# Patient Record
Sex: FEMALE | Race: WHITE | Employment: FULL TIME | URBAN - METROPOLITAN AREA
[De-identification: names, ages, dates, MRNs, and addresses within clinical notes are randomized per-mention and may not be internally consistent; named-entity substitution may affect disease eponyms.]

---

## 2017-04-27 ENCOUNTER — HOSPITAL ENCOUNTER (OUTPATIENT)
Dept: GENERAL RADIOLOGY | Age: 57
Discharge: HOME | End: 2017-04-27
Payer: COMMERCIAL

## 2017-04-27 ENCOUNTER — HOSPITAL ENCOUNTER (OUTPATIENT)
Age: 57
Discharge: HOME | End: 2017-04-27
Payer: COMMERCIAL

## 2017-04-27 DIAGNOSIS — R05.9 COUGH: ICD-10-CM

## 2017-04-27 PROCEDURE — 71020 XR CHEST STANDARD TWO VW: CPT

## 2017-05-18 ENCOUNTER — HOSPITAL ENCOUNTER (OUTPATIENT)
Dept: VASCULAR LAB | Age: 57
Discharge: HOME | End: 2017-05-18
Payer: COMMERCIAL

## 2017-05-18 PROCEDURE — 93971 EXTREMITY STUDY: CPT

## 2017-08-17 ENCOUNTER — HOSPITAL ENCOUNTER (OUTPATIENT)
Age: 57
Discharge: HOME | End: 2017-08-17
Payer: COMMERCIAL

## 2017-08-17 ENCOUNTER — HOSPITAL ENCOUNTER (OUTPATIENT)
Dept: GENERAL RADIOLOGY | Age: 57
Discharge: HOME | End: 2017-08-17
Payer: COMMERCIAL

## 2017-08-17 DIAGNOSIS — R52 PAIN: ICD-10-CM

## 2017-08-17 DIAGNOSIS — R60.9 SWELLING: ICD-10-CM

## 2017-08-17 PROCEDURE — 73130 X-RAY EXAM OF HAND: CPT

## 2018-05-15 ENCOUNTER — HOSPITAL ENCOUNTER (OUTPATIENT)
Age: 58
Discharge: HOME | End: 2018-05-15
Payer: COMMERCIAL

## 2018-05-15 LAB
ABSOLUTE EOS #: 0.1 K/UL (ref 0–0.4)
ABSOLUTE EOS #: 0.1 K/UL (ref 0–0.4)
ABSOLUTE IMMATURE GRANULOCYTE: NORMAL K/UL (ref 0–0.3)
ABSOLUTE IMMATURE GRANULOCYTE: NORMAL K/UL (ref 0–0.3)
ABSOLUTE LYMPH #: 2.2 K/UL (ref 1–4.8)
ABSOLUTE LYMPH #: 2.2 K/UL (ref 1–4.8)
ABSOLUTE MONO #: 0.4 K/UL (ref 0.1–1.3)
ABSOLUTE MONO #: 0.4 K/UL (ref 0.1–1.3)
ALBUMIN SERPL-MCNC: 4.4 G/DL (ref 3.5–5.2)
ALBUMIN/GLOBULIN RATIO: ABNORMAL (ref 1–2.5)
ALP BLD-CCNC: 98 U/L (ref 35–104)
ALT SERPL-CCNC: 69 U/L (ref 5–33)
ANION GAP SERPL CALCULATED.3IONS-SCNC: 14 MMOL/L (ref 9–17)
AST SERPL-CCNC: 72 U/L
BASOPHILS # BLD: 0 % (ref 0–2)
BASOPHILS # BLD: 0 % (ref 0–2)
BASOPHILS ABSOLUTE: 0 K/UL (ref 0–0.2)
BASOPHILS ABSOLUTE: 0 K/UL (ref 0–0.2)
BILIRUB SERPL-MCNC: 0.51 MG/DL (ref 0.3–1.2)
BUN BLDV-MCNC: 8 MG/DL (ref 6–20)
BUN/CREAT BLD: ABNORMAL (ref 9–20)
C-REACTIVE PROTEIN: 10.1 MG/L (ref 0–5)
CALCIUM SERPL-MCNC: 9.1 MG/DL (ref 8.6–10.4)
CHLORIDE BLD-SCNC: 99 MMOL/L (ref 98–107)
CO2: 26 MMOL/L (ref 20–31)
CORTISOL COLLECTION INFO: NORMAL
CORTISOL: 12.1 UG/DL (ref 2.7–18.4)
CREAT SERPL-MCNC: 0.6 MG/DL (ref 0.5–0.9)
DIFFERENTIAL TYPE: NORMAL
DIFFERENTIAL TYPE: NORMAL
EOSINOPHILS RELATIVE PERCENT: 1 % (ref 0–4)
EOSINOPHILS RELATIVE PERCENT: 1 % (ref 0–4)
GFR AFRICAN AMERICAN: >60 ML/MIN
GFR NON-AFRICAN AMERICAN: >60 ML/MIN
GFR SERPL CREATININE-BSD FRML MDRD: ABNORMAL ML/MIN/{1.73_M2}
GFR SERPL CREATININE-BSD FRML MDRD: ABNORMAL ML/MIN/{1.73_M2}
GLUCOSE BLD-MCNC: 160 MG/DL (ref 70–99)
HCT VFR BLD CALC: 37.7 % (ref 36–46)
HCT VFR BLD CALC: 37.7 % (ref 36–46)
HEMOGLOBIN: 12.8 G/DL (ref 12–16)
HEMOGLOBIN: 12.8 G/DL (ref 12–16)
IMMATURE GRANULOCYTES: NORMAL %
IMMATURE GRANULOCYTES: NORMAL %
LYMPHOCYTES # BLD: 30 % (ref 24–44)
LYMPHOCYTES # BLD: 30 % (ref 24–44)
MCH RBC QN AUTO: 30.9 PG (ref 26–34)
MCH RBC QN AUTO: 30.9 PG (ref 26–34)
MCHC RBC AUTO-ENTMCNC: 33.9 G/DL (ref 31–37)
MCHC RBC AUTO-ENTMCNC: 33.9 G/DL (ref 31–37)
MCV RBC AUTO: 91.2 FL (ref 80–100)
MCV RBC AUTO: 91.2 FL (ref 80–100)
MONOCYTES # BLD: 6 % (ref 1–7)
MONOCYTES # BLD: 6 % (ref 1–7)
NRBC AUTOMATED: NORMAL PER 100 WBC
NRBC AUTOMATED: NORMAL PER 100 WBC
PDW BLD-RTO: 13.1 % (ref 11.5–14.9)
PDW BLD-RTO: 13.1 % (ref 11.5–14.9)
PLATELET # BLD: 266 K/UL (ref 150–450)
PLATELET # BLD: 266 K/UL (ref 150–450)
PLATELET ESTIMATE: NORMAL
PLATELET ESTIMATE: NORMAL
PMV BLD AUTO: 7.4 FL (ref 6–12)
PMV BLD AUTO: 7.4 FL (ref 6–12)
POTASSIUM SERPL-SCNC: 3.6 MMOL/L (ref 3.7–5.3)
RBC # BLD: 4.14 M/UL (ref 4–5.2)
RBC # BLD: 4.14 M/UL (ref 4–5.2)
RBC # BLD: NORMAL 10*6/UL
RBC # BLD: NORMAL 10*6/UL
RHEUMATOID FACTOR: <10 IU/ML
SEG NEUTROPHILS: 63 % (ref 36–66)
SEG NEUTROPHILS: 63 % (ref 36–66)
SEGMENTED NEUTROPHILS ABSOLUTE COUNT: 4.7 K/UL (ref 1.3–9.1)
SEGMENTED NEUTROPHILS ABSOLUTE COUNT: 4.7 K/UL (ref 1.3–9.1)
SODIUM BLD-SCNC: 139 MMOL/L (ref 135–144)
T3 FREE: 3.37 PG/ML (ref 2.02–4.43)
THYROXINE, FREE: 1.59 NG/DL (ref 0.93–1.7)
TOTAL PROTEIN: 7.1 G/DL (ref 6.4–8.3)
TSH SERPL DL<=0.05 MIU/L-ACNC: 1.52 MIU/L (ref 0.3–5)
VITAMIN D 25-HYDROXY: 34.1 NG/ML (ref 30–100)
WBC # BLD: 7.4 K/UL (ref 3.5–11)
WBC # BLD: 7.4 K/UL (ref 3.5–11)
WBC # BLD: NORMAL 10*3/UL
WBC # BLD: NORMAL 10*3/UL

## 2018-05-15 PROCEDURE — 80053 COMPREHEN METABOLIC PANEL: CPT

## 2018-05-15 PROCEDURE — 84443 ASSAY THYROID STIM HORMONE: CPT

## 2018-05-15 PROCEDURE — 86140 C-REACTIVE PROTEIN: CPT

## 2018-05-15 PROCEDURE — 82024 ASSAY OF ACTH: CPT

## 2018-05-15 PROCEDURE — 84481 FREE ASSAY (FT-3): CPT

## 2018-05-15 PROCEDURE — 85025 COMPLETE CBC W/AUTO DIFF WBC: CPT

## 2018-05-15 PROCEDURE — 86431 RHEUMATOID FACTOR QUANT: CPT

## 2018-05-15 PROCEDURE — 82306 VITAMIN D 25 HYDROXY: CPT

## 2018-05-15 PROCEDURE — 83970 ASSAY OF PARATHORMONE: CPT

## 2018-05-15 PROCEDURE — 86038 ANTINUCLEAR ANTIBODIES: CPT

## 2018-05-15 PROCEDURE — 84439 ASSAY OF FREE THYROXINE: CPT

## 2018-05-15 PROCEDURE — 82330 ASSAY OF CALCIUM: CPT

## 2018-05-15 PROCEDURE — 82533 TOTAL CORTISOL: CPT

## 2018-05-15 PROCEDURE — 36415 COLL VENOUS BLD VENIPUNCTURE: CPT

## 2018-05-16 LAB
ADRENOCORTICOTROPIC HORMONE: 11 PG/ML (ref 6–58)
ANTI-NUCLEAR ANTIBODY (ANA): NEGATIVE
CALCIUM IONIZED: NORMAL MMOL/L (ref 1.13–1.33)
PTH INTACT: 41.25 PG/ML (ref 15–65)

## 2018-06-27 ENCOUNTER — HOSPITAL ENCOUNTER (OUTPATIENT)
Dept: DIABETES SERVICES | Age: 58
Setting detail: THERAPIES SERIES
Discharge: HOME | End: 2018-06-27
Payer: COMMERCIAL

## 2018-06-27 VITALS — BODY MASS INDEX: 32.14 KG/M2 | HEIGHT: 64 IN | WEIGHT: 188.27 LBS

## 2018-06-27 PROCEDURE — G0108 DIAB MANAGE TRN  PER INDIV: HCPCS

## 2018-07-30 ENCOUNTER — HOSPITAL ENCOUNTER (OUTPATIENT)
Dept: DIABETES SERVICES | Age: 58
Setting detail: THERAPIES SERIES
Discharge: HOME | End: 2018-07-30
Payer: COMMERCIAL

## 2018-07-30 VITALS — BODY MASS INDEX: 30.73 KG/M2 | WEIGHT: 179.01 LBS

## 2018-07-30 PROCEDURE — G0108 DIAB MANAGE TRN  PER INDIV: HCPCS

## 2018-07-30 NOTE — PROGRESS NOTES
Injectable - Appropriate injection sites; proper storage; supplies needed; proper technique; safe needle disposal guidelines. 1       Monitoring blood glucose, interpreting and using results:  Identify recommended & personal blood glucose targets; importance of testing; testing supplies; HgbA1C target levels; Factors affecting blood glucose; Importance of logging blood glucose levels for pattern recognition; ketone testing; safe lancet disposal.   1 7/30/18cb   Not testing. Random BG after food poisoning was 163 so Dr did A1C after weekend and was 7.4%  Discussed A1C range for non DM and pre DM.6/27/18CB    Set up demo Contour Next and given to pt ( free of charge). Self BG done 112 after eating apple. 7/30/18cb   Prevention, detection & treatment of acute complications:  Identify symptoms of hyper & hypoglycemia, and prevention & treatment strategies. 1 7/30/18cb   Pt grew up with hypoglycemia and avoided sweets back then, SX included shaky, headache, nausea, sometimes vomiting and usually drank a little pop.  6/27/18CB     Describe sick day guidelines & indications for  physician notification. Identify short term consequences of poor control. 1    Did have resp virus and Solu Medrol pack over winter pre DM diagnosis. Discussed effects on BG with illness and steroids. 7/30/18cb   Prevention, detection & treatment of chronic complications:  Define the natural course of diabetes & describe the relationship of blood glucose levels to long term complications of diabetes. Identify preventative measures & standards of care. 1       Developing strategies to address psychosocial issues:  Describe feelings about living with diabetes; Describe how stress, depression & anxiety affect blood glucose; Identify coping strategies; Identify support needed & support network available. 1    Concerned and wants to improve BG. Motivated to know how many CHO to stay with meals and snacks and to lose weight.   [facility] information sheet       []Self-Management  Gestational - RN class -Resource materials provided today include educational self care booklet - \" Gestational Diabetes - A Healthy pregnancy and beyond\"  with SMGB and 3 small meals and 3 small snacks diet plan. SMBG sheets to fax back to MFM weekly.  [facility] guidelines for SMGB and blood glucose log sheets, Never too early to prevent diabetes handout from [facility]      []Self-Management Gestational - RD class - My Food Plan for Gestational diabetes    [x]Glucose Meter set up Contour Next ( free of charge) to take home  []Insulin Kit     []Other      Encounter Type Date Start Time End Time Comments No Show Dates   Assessment 6/27/18CB   3:00 4:00   [x]In Person  []Telephone    Class 1 - Understanding diabetes 7/30/18cb 4:30 5:45  x    Class 2- Nutrition and diabetes          Class 3 - Preventing Complications         Class 4 -  In depth Nutrition and sick day care        Class 5 - 3 month follow up / goal reassessment        Gestational - RN         Gestational - RD        Individual MNT         Shared Med Appt         Yearly Follow-up        Meter Instrx 7/30/18cb 5:15 5:30 X set up Contour Next ( free of charge) to take home    Insulin Instrx      []Pen  []Vial & Syringe      DSMS Support Plan:  Follow-up plan:     [] MNT referral request / Appointment     [] Annual update referral request and appointment  after      []ADA  Where do I Begin, Living with Type 2 diabetes ADA home support program     [] Healthy U - Diabetes workshops via [address] [place] [address] [phone]      [] Starting [employer] program /  [employer] [phone]    []  Support Group / Yadira Huerta of [employer] - Free 6 week diabetes education support   classes Maribell Chacon [phone]      []   [employer] application  / scholarship program     []ADA care 4 life [place]      []  Internet web sites - ADA and D- life    Post Education Referrals:      [] [address] [facility] [address] information sheet and [address] [facility] , 21       [] Dental care - Dental care of [facility]     [] [facility] link  phone number - for information and referral to [address], WEIGHT MANAGEMENT        []Other  Kaye Stewart RN

## 2018-08-22 ENCOUNTER — HOSPITAL ENCOUNTER (OUTPATIENT)
Dept: DIABETES SERVICES | Age: 58
Setting detail: THERAPIES SERIES
Discharge: HOME | End: 2018-08-22
Payer: COMMERCIAL

## 2018-08-22 PROCEDURE — G0108 DIAB MANAGE TRN  PER INDIV: HCPCS

## 2018-08-22 NOTE — PROGRESS NOTES
Diabetes Self- Management Education Program Assessment -   Also see Diabetic Screening  Patient, Wendy Walter,  here for diabetes self-management education  visit/ assessment. Today's visit was in an individual setting. Diet History :  Diet Questionnaire and typical meal /portion sheet completed  [x]Yes  [] NO    Goals setting:  Goal work sheet completed  [x]Yes  [] NO  (SEE  EDUCATION AND GOALS FLOWSHEET)    MEDICAL HISTORY:  No past medical history on file. No family history on file. Codeine and Ibuprofen   Immunization History   Administered Date(s) Administered    Influenza Virus Vaccine 10/06/2017     Current Medications  Current Outpatient Prescriptions   Medication Sig Dispense Refill    metFORMIN (GLUCOPHAGE) 1000 MG tablet Take 1,000 mg by mouth 2 times daily (with meals) Taking half of the 1000 mg tablet 2x/d        No current facility-administered medications for this encounter.    :     Comments:  Allergies: Allergies   Allergen Reactions    Codeine      BILIARY COLIC    Ibuprofen Itching and Rash     Diabetes 5  / Health Status    A1C blood level - at goal < 7%   No results found for: LABA1C  Lab Results   Component Value Date    CREATININE 0.60 05/15/2018       Blood pressure ( 130/ 80)  Or less  BP Readings from Last 3 Encounters:   No data found for BP        Cholesterol ( LDL under  100)   Lab Results   Component Value Date    LDLCHOLESTEROL 87 12/06/2013       4 . Smoking ? []Yes   [x]No    5. Taking an Asprin daily? []Yes   [x]No          Diabetes Self- Management Education Record    Participant Name: Wendy Walter  Referring Provider: Sridevi Urias   Assessment/Evaluation Ratings:  1=Needs Instruction   4=Demonstrates Understanding/Competency  2=Needs Review   NC=Not Covered    3=Comprehends Key Points  N/A=Not Applicable  Topics/Learning Objectives Pre-session Assess Date:  6/27/18CB Instr.  Date Reinforce Date Post- session Eval Comments   Diabetes disease process & MNT referral request / Appointment     [] Annual update referral request and appointment  after      []ADA  Where do I Begin, Living with Type 2 diabetes ADA home support program     [] Healthy U - Diabetes workshops via [facility] link       [] Starting [employer] program /  [employer] [phone]    []  Support Group / Mary  6 week diabetes education support   classes Helena Roger [phone]      []   [email or web address] application  / scholarship program     []ADA care 4 life [place]      []  Internet web sites - ADA and D- life    Post Education Referrals:      [] [address] [facility] information sheet and [address] [facility] , 21       [] Dental care - Dental care of [employer]     [] [facility] link  phone number - for information and referral to [facility]  Clinically  [address] [facility], WEIGHT MANAGEMENT        []Other  Galina Chino RN

## 2018-08-27 ENCOUNTER — HOSPITAL ENCOUNTER (OUTPATIENT)
Dept: DIABETES SERVICES | Age: 58
Setting detail: THERAPIES SERIES
Discharge: HOME | End: 2018-08-27
Payer: COMMERCIAL

## 2018-08-27 DIAGNOSIS — E11.9 TYPE 2 DIABETES MELLITUS WITHOUT COMPLICATION, WITHOUT LONG-TERM CURRENT USE OF INSULIN (HCC): Primary | ICD-10-CM

## 2018-08-27 PROCEDURE — G0108 DIAB MANAGE TRN  PER INDIV: HCPCS

## 2018-08-27 NOTE — PROGRESS NOTES
Treatment process: Define diabetes & pre-diabetes; Identify own type of diabetes; role of the pancreas; signs/symptoms; diagnostic criteria; prevention & treatment options; contributing factors. 2 7/30/18cb 8/22/18CB  Some knowledge as she works [employer], Grandmother T2 DM         6/27/18CB  Has not really told anyone about her DM as coworker has DM on insulin but not as careful with control. 7/30/18cb     Incorporating nutritional management into lifestyle: Describe effect of type, amount & timing of food on blood glucose; Describe basic meal planning techniques & current nutrition guideline   1 eats 3 meals, occ snacks, drinks diet Coke but cut down to 1 can/d, mainly water    Had food poisoning  End of May and just now getting appetite back, lost 20#        6/27/18CB 8/27/18 JW   What to eat - Food groups, When to eat - timing of meals and snacks, and How much to eat - portions control. 1500 calories/ day   CHO choices/ meal  3,1,3,1,3,1   CHO choices/  day   grams of protein /day   gram of fat /day     Correctly read food labels & demonstrate CHO counting & portion control with personalized meal plan. Identify dining out strategies, & dietary sick day guidelines. 1 8/27/18 JW   Info given about probiotics to help with IBS   Incorporating physical activity into lifestyle:   Verbalize effect of exercise on blood glucose levels; benefits of regular exercise; safety considerations; contraindications; maintenance of activity. 1 7/30/18cb 8/22/18CB  Trying to walk daily with puppy for 15 min, but does have herniated disc in both neck and back. 6/27/18CB    Walking dog around pond for about 30 min. 7/30/18cb   Using medications safely:  Identify effects of diabetes medicines on blood glucose levels;  List diabetes medication taken, action & side effects;    1 7/30/18cb 8/22/18CB  Metformin 1000 mg, had diarrhea taking it 2x/d and talked with Pharmacist/Dr, cut 1/2 and taking (500 mg) 2x/d and much better. 6/27/18CB    Now taking Metformin 1000 mg BID and added Metamucil HS which helps7/30/18cb    Tried to increase Metformin to take 500 mg am ad 1000 mg evening but having diarrhea. suggested to request XR.   8/22/18CB   Insulin / Injectable - Appropriate injection sites; proper storage; supplies needed; proper technique; safe needle disposal guidelines. 1 8/22/18CB   NA   Monitoring blood glucose, interpreting and using results:  Identify recommended & personal blood glucose targets; importance of testing; testing supplies; HgbA1C target levels; Factors affecting blood glucose; Importance of logging blood glucose levels for pattern recognition; ketone testing; safe lancet disposal.   1 7/30/18cb 8/22/18CB  Not testing. Random BG after food poisoning was 163 so Dr did A1C after weekend and was 7.4%  Discussed A1C range for non DM and pre DM.6/27/18CB    Set up demo Contour Next and given to pt ( free of charge). Self BG done 112 after eating apple. 7/30/18cb    Has tested several times, fasting 106-112, 77 pre lunch and had blurred vision. 8/22/18CB   Prevention, detection & treatment of acute complications:  Identify symptoms of hyper & hypoglycemia, and prevention & treatment strategies. 1 7/30/18cb 8/22/18CB  Pt grew up with hypoglycemia and avoided sweets back then, SX included shaky, headache, nausea, sometimes vomiting and usually drank a little pop.  6/27/18CB     Describe sick day guidelines & indications for  physician notification. Identify short term consequences of poor control. 1    Did have resp virus and Solu Medrol pack over winter pre DM diagnosis. Discussed effects on BG with illness and steroids. 7/30/18cb   Prevention, detection & treatment of chronic complications:  Define the natural course of diabetes & describe the relationship of blood glucose levels to long term complications of diabetes. Identify preventative measures & standards of care.    1 8/22/18CB   Had flu vaccine, no Counting / Plate Method, Nutrient Conversion and International Diabetes [address] Eating for People with Diabetes and Nutrition in the [employer] - fast facts about fast food 8/27/18 JW    [x] Self-Management  Class 3 -  Diabetes ID card,  foot care tips sheet, Healthy I  Continuing Your Journey with Diabetes map handout, Individualized Diabetes report card8/22/18CB    [] Self-Management Class 4 - BD Booklet  Sick Day Rules and  Dinning Out Guide , recipe hand outs and tips, diabetes Cookbooks  ( when available)     []Self-Management - 3 month follow - up  AADE booklet Side by Byron Steward a partnership approach to diabetes self- care, [place] [employer] Quit line, [facility] Diabetes support program information sheet, [facility] [employer] information sheet       []Self-Management  Gestational - RN class -Resource materials provided today include educational self care booklet - \" Gestational Diabetes - A Healthy pregnancy and beyond\"  with SMGB and 3 small meals and 3 small snacks diet plan. SMBG sheets to fax back to [facility] weekly.  [facility] guidelines for SMGB and blood glucose log sheets, Never too early to prevent diabetes handout from [facility]      []Self-Management Gestational - RD class - My Food Plan for Gestational diabetes    [x]Glucose Meter set up Contour Next ( free of charge) to take home  []Insulin Kit     []Other      Encounter Type Date Start Time End Time Comments No Show Dates   Assessment 6/27/18CB   3:00 4:00   [x]In Person  []Telephone    Class 1 - Understanding diabetes 7/30/18cb 4:30 5:45  x    Class 2- Nutrition and diabetes   8/27/18 JW 1:30 3:00 x    Class 3 - Preventing Complications 9/44/52CH 3:18 5:45      Class 4 -  In depth Nutrition and sick day care        Class 5 - 3 month follow up / goal reassessment        Gestational - RN         Gestational - RD        Individual MNT         Shared Med Appt         Yearly Follow-up        Meter Instrx 7/30/18cb 5:15 5:30 X set up Contour Next

## 2021-09-02 ENCOUNTER — HOSPITAL ENCOUNTER (OUTPATIENT)
Age: 61
Setting detail: SPECIMEN
Discharge: HOME | End: 2021-09-02
Payer: COMMERCIAL

## 2021-09-03 LAB
CREATININE URINE: 77.1 MG/DL (ref 28–217)
MICROALBUMIN/CREAT 24H UR: <12 MG/L
MICROALBUMIN/CREAT UR-RTO: NORMAL MCG/MG CREAT

## 2021-12-21 ENCOUNTER — HOSPITAL ENCOUNTER (OUTPATIENT)
Age: 61
Discharge: HOME | End: 2021-12-21
Payer: COMMERCIAL

## 2021-12-21 LAB
ALT SERPL-CCNC: 21 U/L (ref 5–33)
ANION GAP SERPL CALCULATED.3IONS-SCNC: 11 MMOL/L (ref 9–17)
BUN BLDV-MCNC: 11 MG/DL (ref 8–23)
BUN/CREAT BLD: ABNORMAL (ref 9–20)
CALCIUM SERPL-MCNC: 9.7 MG/DL (ref 8.6–10.4)
CHLORIDE BLD-SCNC: 102 MMOL/L (ref 98–107)
CHOLESTEROL/HDL RATIO: 3.9
CHOLESTEROL: 251 MG/DL
CO2: 26 MMOL/L (ref 20–31)
CREAT SERPL-MCNC: 0.66 MG/DL (ref 0.5–0.9)
GFR AFRICAN AMERICAN: >60 ML/MIN
GFR NON-AFRICAN AMERICAN: >60 ML/MIN
GFR SERPL CREATININE-BSD FRML MDRD: ABNORMAL ML/MIN/{1.73_M2}
GFR SERPL CREATININE-BSD FRML MDRD: ABNORMAL ML/MIN/{1.73_M2}
GLUCOSE BLD-MCNC: 105 MG/DL (ref 70–99)
HDLC SERPL-MCNC: 65 MG/DL
LDL CHOLESTEROL: 161 MG/DL (ref 0–130)
POTASSIUM SERPL-SCNC: 4.8 MMOL/L (ref 3.7–5.3)
SODIUM BLD-SCNC: 139 MMOL/L (ref 135–144)
TRIGL SERPL-MCNC: 127 MG/DL
TSH SERPL DL<=0.05 MIU/L-ACNC: 0.82 MIU/L (ref 0.3–5)
VITAMIN D 25-HYDROXY: 64.6 NG/ML (ref 30–100)
VLDLC SERPL CALC-MCNC: ABNORMAL MG/DL (ref 1–30)

## 2021-12-21 PROCEDURE — 84443 ASSAY THYROID STIM HORMONE: CPT

## 2021-12-21 PROCEDURE — 80061 LIPID PANEL: CPT

## 2021-12-21 PROCEDURE — 80048 BASIC METABOLIC PNL TOTAL CA: CPT

## 2021-12-21 PROCEDURE — 36415 COLL VENOUS BLD VENIPUNCTURE: CPT

## 2021-12-21 PROCEDURE — 84460 ALANINE AMINO (ALT) (SGPT): CPT

## 2021-12-21 PROCEDURE — 82306 VITAMIN D 25 HYDROXY: CPT

## 2021-12-23 ENCOUNTER — HOSPITAL ENCOUNTER (OUTPATIENT)
Age: 61
Setting detail: SPECIMEN
Discharge: HOME | End: 2021-12-23

## 2021-12-23 LAB
CREATININE URINE: 25.2 MG/DL (ref 28–217)
MICROALBUMIN/CREAT 24H UR: <12 MG/L
MICROALBUMIN/CREAT UR-RTO: ABNORMAL MCG/MG CREAT

## 2023-08-23 ENCOUNTER — HOSPITAL ENCOUNTER (OUTPATIENT)
Age: 63
Setting detail: SPECIMEN
Discharge: HOME | End: 2023-08-23

## 2023-08-23 LAB
CREAT UR-MCNC: 116 MG/DL (ref 28–217)
MICROALBUMIN UR-MCNC: <12 MG/L
MICROALBUMIN/CREAT UR-RTO: NORMAL MCG/MG CREAT

## 2024-01-02 ENCOUNTER — HOSPITAL ENCOUNTER (OUTPATIENT)
Age: 64
Setting detail: SPECIMEN
Discharge: HOME | End: 2024-01-02

## 2024-01-03 LAB
CREAT UR-MCNC: 131.7 MG/DL (ref 28–217)
MICROALBUMIN UR-MCNC: <12 MG/L
MICROALBUMIN/CREAT UR-RTO: NORMAL MCG/MG CREAT

## 2025-03-03 ENCOUNTER — OFFICE VISIT (OUTPATIENT)
Dept: PODIATRY | Age: 65
End: 2025-03-03
Payer: COMMERCIAL

## 2025-03-03 VITALS — HEIGHT: 63 IN | WEIGHT: 170 LBS | BODY MASS INDEX: 30.12 KG/M2

## 2025-03-03 DIAGNOSIS — L60.0 ONYCHOCRYPTOSIS: Primary | ICD-10-CM

## 2025-03-03 DIAGNOSIS — M79.674 PAIN IN TOE OF RIGHT FOOT: ICD-10-CM

## 2025-03-03 PROCEDURE — G8400 PT W/DXA NO RESULTS DOC: HCPCS | Performed by: PODIATRIST

## 2025-03-03 PROCEDURE — 99203 OFFICE O/P NEW LOW 30 MIN: CPT | Performed by: PODIATRIST

## 2025-03-03 PROCEDURE — G8427 DOCREV CUR MEDS BY ELIG CLIN: HCPCS | Performed by: PODIATRIST

## 2025-03-03 PROCEDURE — 3017F COLORECTAL CA SCREEN DOC REV: CPT | Performed by: PODIATRIST

## 2025-03-03 PROCEDURE — G8417 CALC BMI ABV UP PARAM F/U: HCPCS | Performed by: PODIATRIST

## 2025-03-03 PROCEDURE — 1036F TOBACCO NON-USER: CPT | Performed by: PODIATRIST

## 2025-03-03 PROCEDURE — 1090F PRES/ABSN URINE INCON ASSESS: CPT | Performed by: PODIATRIST

## 2025-03-03 PROCEDURE — 1123F ACP DISCUSS/DSCN MKR DOCD: CPT | Performed by: PODIATRIST

## 2025-03-03 RX ORDER — SERTRALINE HYDROCHLORIDE 100 MG/1
100 TABLET, FILM COATED ORAL DAILY
COMMUNITY
Start: 2025-01-02

## 2025-03-03 RX ORDER — METHOCARBAMOL 500 MG/1
TABLET, FILM COATED ORAL
COMMUNITY
Start: 2024-12-11

## 2025-03-03 RX ORDER — FAMOTIDINE 10 MG
10 TABLET ORAL 2 TIMES DAILY
COMMUNITY

## 2025-03-03 RX ORDER — CETIRIZINE HYDROCHLORIDE 10 MG/1
10 TABLET ORAL DAILY
COMMUNITY

## 2025-03-03 RX ORDER — LEVOTHYROXINE SODIUM 88 UG/1
88 TABLET ORAL DAILY
COMMUNITY
Start: 2025-01-02

## 2025-03-03 RX ORDER — TIRZEPATIDE 7.5 MG/.5ML
INJECTION, SOLUTION SUBCUTANEOUS
COMMUNITY
Start: 2024-09-30

## 2025-03-03 RX ORDER — ATORVASTATIN CALCIUM 10 MG/1
10 TABLET, FILM COATED ORAL DAILY
COMMUNITY

## 2025-03-03 RX ORDER — VITAMIN E (DL,TOCOPHERYL ACET) 180 MG
500 CAPSULE ORAL DAILY
COMMUNITY
Start: 2025-01-06

## 2025-03-03 RX ORDER — LISINOPRIL 10 MG/1
1 TABLET ORAL DAILY
COMMUNITY
Start: 2024-07-24

## 2025-03-03 RX ORDER — ASPIRIN 81 MG/1
81 TABLET ORAL DAILY
COMMUNITY

## 2025-03-03 ASSESSMENT — ENCOUNTER SYMPTOMS
NAUSEA: 0
SHORTNESS OF BREATH: 0
DIARRHEA: 0
COLOR CHANGE: 0
BACK PAIN: 0

## 2025-03-03 NOTE — PROGRESS NOTES
Sarahi Stokes is a 65 y.o. female who presents to the office today with chief complaint of pain to the right great toenail.  Chief Complaint   Patient presents with    Nail Problem     Painful right hallux toenail     Diabetes     Patient does not check blood sugar    Symptoms began about 4 month(s) ago. Patient denies injury to the right great toe. Patient states that she is wearing compression stockings and this is what is causing the pain to her big toe. Pain is rated 3 out of 10 at it's worst and is described as intermittent. Treatments prior to today's visit include: None.      Allergies   Allergen Reactions    Codeine      BILIARY COLIC    Ibuprofen Itching and Rash       Past Medical History:   Diagnosis Date    Diabetes (HCC)     GERD (gastroesophageal reflux disease)     High blood pressure        Prior to Admission medications    Medication Sig Start Date End Date Taking? Authorizing Provider   MOUNJARO 7.5 MG/0.5ML SOAJ INJECT 7.5MG (0.5ML) UNDER THE SKIN ONCE WEEKLY 9/30/24  Yes Judith Piper MD   sertraline (ZOLOFT) 100 MG tablet Take 1 tablet by mouth daily 1/2/25  Yes Judith Piper MD   methocarbamol (ROBAXIN) 500 MG tablet Take 1 tablet by mouth in the morning and 1 tablet in the evening and 1 tablet before bedtime. Do all this for 10 days. 12/11/24  Yes Judith Piper MD   Magnesium Oxide -Mg Supplement 500 MG CAPS Take 500 mg by mouth daily 1/6/25  Yes Judith Piper MD   lisinopril (PRINIVIL;ZESTRIL) 10 MG tablet Take 1 tablet by mouth daily 7/24/24  Yes Judith Piper MD   levothyroxine (SYNTHROID) 88 MCG tablet Take 1 tablet by mouth daily 1/2/25  Yes Judith Piper MD   famotidine (PEPCID) 10 MG tablet Take 1 tablet by mouth 2 times daily   Yes Judith Piper MD   aspirin 81 MG EC tablet Take 1 tablet by mouth daily   Yes Judith Piper MD   atorvastatin (LIPITOR) 10 MG tablet Take 1 tablet by mouth daily   Yes Judith Piper

## 2025-03-27 ENCOUNTER — PROCEDURE VISIT (OUTPATIENT)
Dept: PODIATRY | Age: 65
End: 2025-03-27

## 2025-03-27 VITALS — HEIGHT: 63 IN | BODY MASS INDEX: 30.12 KG/M2 | WEIGHT: 170 LBS

## 2025-03-27 DIAGNOSIS — L60.0 ONYCHOCRYPTOSIS: Primary | ICD-10-CM

## 2025-03-27 DIAGNOSIS — M79.674 PAIN IN TOE OF RIGHT FOOT: ICD-10-CM

## 2025-03-27 NOTE — PROGRESS NOTES
[facility] Pod[facility]ry  Return Patient Progress Note    Subjective: Sarahi Stokes 65 y.o. female that presents for follow up evaluation of painful ingrown nail right great toe.  Chief Complaint   Patient presents with    Nail Problem     Right hallux     Patient states that her pain has not improved and she is requesting removal of this ingrown nail.  Pain is rated 3 out of 10 and is described as intermittent.     Review of Systems   Constitutional:  Negative for activity change, appetite change, chills, diaphoresis, fatigue and fever.   Respiratory:  Negative for shortness of breath.    Cardiovascular:  Negative for leg swelling.   Gastrointestinal:  Negative for diarrhea and nausea.   Endocrine: Negative for cold intolerance, heat intolerance and polyuria.   Musculoskeletal:  Positive for arthralgias. Negative for back pain, gait problem, joint swelling and myalgias.   Skin:  Negative for color change, pallor, rash and wound.   Allergic/Immunologic: Negative for environmental allergies and food allergies.   Neurological:  Negative for dizziness, weakness, light-headedness and numbness.   Hematological:  Does not bruise/bleed easily.   Psychiatric/Behavioral:  Negative for behavioral problems, confusion and self-injury. The patient is not nervous/anxious.        Objective: Clinical evaluation of the patient reveals ingrowth to the medial border of the right hallux nail plate. There is no erythema, calor, drainage, or malodor noted to this area.  There is edema noted to this area of the right hallux.  There is pain with palpation to this area of the right hallux.     Assessment:    Diagnosis Orders   1. Onychocryptosis  REMOVAL OF NAIL MATRIX      2. Pain in toe of right foot  REMOVAL OF NAIL MATRIX            Plan: 1. Clinical evaluation of the patient. 2.  I recommended a partial nail avulsion with chemical matricectomy to the medial borders of the right hallux. A consent was signed and placed in the chart.

## 2025-03-28 ASSESSMENT — ENCOUNTER SYMPTOMS
BACK PAIN: 0
DIARRHEA: 0
NAUSEA: 0
SHORTNESS OF BREATH: 0
COLOR CHANGE: 0

## 2025-04-10 ENCOUNTER — OFFICE VISIT (OUTPATIENT)
Dept: PODIATRY | Age: 65
End: 2025-04-10
Payer: COMMERCIAL

## 2025-04-10 VITALS — HEIGHT: 63 IN | BODY MASS INDEX: 30.12 KG/M2 | WEIGHT: 170 LBS

## 2025-04-10 DIAGNOSIS — L60.0 ONYCHOCRYPTOSIS: Primary | ICD-10-CM

## 2025-04-10 DIAGNOSIS — M79.674 PAIN IN TOE OF RIGHT FOOT: ICD-10-CM

## 2025-04-10 PROCEDURE — G8417 CALC BMI ABV UP PARAM F/U: HCPCS | Performed by: PODIATRIST

## 2025-04-10 PROCEDURE — G8400 PT W/DXA NO RESULTS DOC: HCPCS | Performed by: PODIATRIST

## 2025-04-10 PROCEDURE — 1090F PRES/ABSN URINE INCON ASSESS: CPT | Performed by: PODIATRIST

## 2025-04-10 PROCEDURE — 1123F ACP DISCUSS/DSCN MKR DOCD: CPT | Performed by: PODIATRIST

## 2025-04-10 PROCEDURE — 3017F COLORECTAL CA SCREEN DOC REV: CPT | Performed by: PODIATRIST

## 2025-04-10 PROCEDURE — 99213 OFFICE O/P EST LOW 20 MIN: CPT | Performed by: PODIATRIST

## 2025-04-10 PROCEDURE — 1036F TOBACCO NON-USER: CPT | Performed by: PODIATRIST

## 2025-04-10 PROCEDURE — G8427 DOCREV CUR MEDS BY ELIG CLIN: HCPCS | Performed by: PODIATRIST

## 2025-04-10 NOTE — PROGRESS NOTES
[facility] Podiat[facility]  Return Patient Progress Note    Subjective: Sarahi Stokes 65 y.o. female that presents for follow up evaluation of follow-up evaluation of removal of ingrown nail right great toe.  Chief Complaint   Patient presents with    Nail Problem     Right foot     Patient's treatment thus far has included daily dressing changes with antibiotic ointment and a Band-Aid.  Pain is rated 0 out of 10 and is described as none. Patient has been following my prescribed course of therapy as instructed.     Review of Systems   Constitutional:  Negative for activity change, appetite change, chills, diaphoresis, fatigue and fever.   Respiratory:  Negative for shortness of breath.    Cardiovascular:  Negative for leg swelling.   Gastrointestinal:  Negative for diarrhea and nausea.   Endocrine: Negative for cold intolerance, heat intolerance and polyuria.   Musculoskeletal:  Positive for arthralgias. Negative for back pain, gait problem, joint swelling and myalgias.   Skin:  Positive for wound. Negative for color change, pallor and rash.   Allergic/Immunologic: Negative for environmental allergies and food allergies.   Neurological:  Negative for dizziness, weakness, light-headedness and numbness.   Hematological:  Does not bruise/bleed easily.   Psychiatric/Behavioral:  Negative for behavioral problems, confusion and self-injury. The patient is not nervous/anxious.        Objective: Clinical evaluation of the patient reveals absence to the Medial border of the bilateral hallux nail plate. There is mild erythema and edema remaining to the right hallux. There is loose eschar noted to the Medial border of the right hallux and this is easily removed with a sterile gauze. The nail bed to this area is granular and nearly completely healed at this time. There is no drainage or malodor noted to the right hallux. There is mild pain with palpation and manipulation of the right hallux.    Assessment:    Diagnosis Orders

## 2025-04-14 ASSESSMENT — ENCOUNTER SYMPTOMS
DIARRHEA: 0
COLOR CHANGE: 0
NAUSEA: 0
SHORTNESS OF BREATH: 0
BACK PAIN: 0